# Patient Record
Sex: FEMALE | Race: WHITE
[De-identification: names, ages, dates, MRNs, and addresses within clinical notes are randomized per-mention and may not be internally consistent; named-entity substitution may affect disease eponyms.]

---

## 2018-01-27 ENCOUNTER — HOSPITAL ENCOUNTER (EMERGENCY)
Dept: HOSPITAL 12 - ER | Age: 68
Discharge: ANOTHER HOSPITAL | End: 2018-01-27
Payer: SELF-PAY

## 2018-01-27 VITALS — WEIGHT: 160 LBS | HEIGHT: 61 IN | BODY MASS INDEX: 30.21 KG/M2
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
## 2018-01-27 DIAGNOSIS — R79.89: ICD-10-CM

## 2018-01-27 DIAGNOSIS — S06.6X9A: Primary | ICD-10-CM
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
## 2018-01-27 LAB
ALP SERPL-CCNC: 88 U/L (ref 50–136)
ALT SERPL W/O P-5'-P-CCNC: 68 U/L (ref 14–59)
AST SERPL-CCNC: 50 U/L (ref 15–37)
BASOPHILS # BLD AUTO: 0.1 K/UL (ref 0–8)
BASOPHILS NFR BLD AUTO: 0.5 % (ref 0–2)
BILIRUB SERPL-MCNC: 0.2 MG/DL (ref 0.2–1)
BUN SERPL-MCNC: 11 MG/DL (ref 7–18)
CHLORIDE SERPL-SCNC: 104 MMOL/L (ref 98–107)
CK SERPL-CCNC: 251 U/L (ref 26–192)
CO2 SERPL-SCNC: 29 MMOL/L (ref 21–32)
CREAT SERPL-MCNC: 0.9 MG/DL (ref 0.6–1.3)
EOSINOPHIL # BLD AUTO: 0.1 K/UL (ref 0–0.7)
EOSINOPHIL NFR BLD AUTO: 1 % (ref 0–7)
GLUCOSE SERPL-MCNC: 121 MG/DL (ref 74–106)
HCT VFR BLD AUTO: 39.4 % (ref 31.2–41.9)
HGB BLD-MCNC: 13.4 G/DL (ref 10.9–14.3)
LYMPHOCYTES # BLD AUTO: 1.8 K/UL (ref 20–40)
LYMPHOCYTES NFR BLD AUTO: 16.7 % (ref 20.5–51.5)
MCH RBC QN AUTO: 30 UUG (ref 24.7–32.8)
MCHC RBC AUTO-ENTMCNC: 34 G/DL (ref 32.3–35.6)
MCV RBC AUTO: 87.8 FL (ref 75.5–95.3)
MONOCYTES # BLD AUTO: 0.6 K/UL (ref 2–10)
MONOCYTES NFR BLD AUTO: 5 % (ref 0–11)
NEUTROPHILS # BLD AUTO: 8.5 K/UL (ref 1.8–8.9)
NEUTROPHILS NFR BLD AUTO: 76.8 % (ref 38.5–71.5)
PLATELET # BLD AUTO: 258 K/UL (ref 179–408)
POTASSIUM SERPL-SCNC: 3.8 MMOL/L (ref 3.5–5.1)
RBC # BLD AUTO: 4.48 MIL/UL (ref 3.63–4.92)
WBC # BLD AUTO: 11 K/UL (ref 3.8–11.8)
WS STN SPEC: 7.9 G/DL (ref 6.4–8.2)

## 2018-01-27 PROCEDURE — A4663 DIALYSIS BLOOD PRESSURE CUFF: HCPCS
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
## 2018-01-27 NOTE — NUR
pt taken down to radiology for CT in [facility], in no acute distress.

## 2018-01-27 NOTE — NUR
[facility] had just left. Per Dr Caraballo, this patient has small subarachnoid bleed 
from CT report.  Patient is AOx4, MARLOW, calm & breathing easily, c/o right-sided 
head pains, MD aware.

## 2018-01-27 NOTE — NUR
[facility] here to transfer patient. report given. pt in no 
acute distress. 

-------------------------------------------------------------------------------

Addendum: 01/27/18 at 1753 by [email or web address]

-------------------------------------------------------------------------------

[place] [facility] here to transfer patient. report given. pt in no 
acute distress. Pt transferred with imaging disk, copy of ED chart. Belongings 
given to .